# Patient Record
Sex: FEMALE | Race: WHITE | NOT HISPANIC OR LATINO | Employment: FULL TIME | URBAN - METROPOLITAN AREA
[De-identification: names, ages, dates, MRNs, and addresses within clinical notes are randomized per-mention and may not be internally consistent; named-entity substitution may affect disease eponyms.]

---

## 2023-09-27 PROBLEM — F41.9 ANXIETY: Status: ACTIVE | Noted: 2023-09-27

## 2023-09-27 PROBLEM — D25.9 FIBROID UTERUS: Status: ACTIVE | Noted: 2023-09-27

## 2023-09-27 PROBLEM — R92.0 ABNORMAL MAMMOGRAM WITH MICROCALCIFICATION: Status: ACTIVE | Noted: 2023-09-27

## 2023-09-27 PROBLEM — R92.8 ABNORMAL MAMMOGRAM: Status: ACTIVE | Noted: 2023-09-27

## 2023-09-27 PROBLEM — R11.2 NAUSEA WITH VOMITING: Status: ACTIVE | Noted: 2023-09-27

## 2023-09-27 PROBLEM — N94.6 DYSMENORRHEA: Status: ACTIVE | Noted: 2023-09-27

## 2023-09-27 PROBLEM — N93.8 DYSFUNCTIONAL UTERINE BLEEDING: Status: ACTIVE | Noted: 2023-09-27

## 2023-09-27 PROBLEM — H61.23 BILATERAL IMPACTED CERUMEN: Status: ACTIVE | Noted: 2023-09-27

## 2023-09-27 PROBLEM — H60.543 ECZEMA OF BOTH EXTERNAL EARS: Status: ACTIVE | Noted: 2023-09-27

## 2023-09-27 PROBLEM — H90.3 SENSORINEURAL HEARING LOSS (SNHL) OF BOTH EARS: Status: ACTIVE | Noted: 2023-09-27

## 2023-09-27 PROBLEM — H69.93 DYSFUNCTION OF BOTH EUSTACHIAN TUBES: Status: ACTIVE | Noted: 2023-09-27

## 2023-09-27 RX ORDER — MELATONIN 5 MG
CAPSULE ORAL
COMMUNITY

## 2023-09-27 RX ORDER — ACETAMINOPHEN 500 MG
TABLET ORAL
COMMUNITY
End: 2023-10-30 | Stop reason: SDUPTHER

## 2023-09-27 RX ORDER — FLUTICASONE PROPIONATE 50 MCG
SPRAY, SUSPENSION (ML) NASAL
COMMUNITY
End: 2023-10-30 | Stop reason: WASHOUT

## 2023-09-27 RX ORDER — CETIRIZINE HYDROCHLORIDE 10 MG/1
1 TABLET ORAL DAILY
COMMUNITY

## 2023-09-27 RX ORDER — PAROXETINE HYDROCHLORIDE HEMIHYDRATE 12.5 MG/1
TABLET, FILM COATED, EXTENDED RELEASE ORAL
COMMUNITY
End: 2023-10-30 | Stop reason: WASHOUT

## 2023-09-27 RX ORDER — POLYETHYLENE GLYCOL 3350, SODIUM CHLORIDE, SODIUM BICARBONATE, POTASSIUM CHLORIDE 420; 11.2; 5.72; 1.48 G/4L; G/4L; G/4L; G/4L
POWDER, FOR SOLUTION ORAL
COMMUNITY
Start: 2022-09-19 | End: 2023-10-30 | Stop reason: WASHOUT

## 2023-10-28 NOTE — PROGRESS NOTES
"HPI    PAP 11-29-18 NEG HPV NEG  MAMMO 12-29-22  DEXA NEVER  COLON 12-12-22  Last edited by Fabrice Smith MA on 10/30/2023  2:53 PM.          Subjective   Patient ID: Ele Echols is a 52 y.o. female who presents for No chief complaint on file..  PAP 11-29-18 NEG HPV NEGy, TLH, salpingectomies with Dr. Rascon 1/27/21, benign pathology, no more paps.  MAMMO 12/29/22 NEG   DEXA NEVER  COLON 12/12/22 colonoscopy, polyps. 5 years.     My first visit since hysterectomy with Dr. Rascon. 26 yo son with [facility] and traveling. Other son 24 at home, working [employer]. Patient working [employer]. Got a puppy. \"Segundo\".     Stopped seeing wellness doctor. Sees Dr. Hamm.     Sister with bad thyroid issue. Had parathyroid removed. Has elevated LFTs and liver size. Patient tested negative.     Patient has anxiety, no treatment.     Review of Systems   Genitourinary:         Hot flashes       Objective   Constitutional: Alert and in no acute distress. Well developed, well nourished.  Neck: no neck asymmetry. Supple and thyroid not enlarged and there were no palpable thyroid nodules.  Chest: Breasts normal appearance, no nipple discharge and no skin changes and palpation of breasts and axillae: no palpable mass and no axillary lymphadenopathy.  Abdomen: soft nontender; no abdominal mass palpated, no organomegaly and no hernias.  Genitourinary: external genitalia: normal, no inguinal lymphadenopathy, Bartholin's urethral and [place]'s glands: normal, urethra: normal and bladder: normal on palpation.  Vagina: normal.  Cervix: absent.  Uterus: absent.  Right adnexa/parametria: normal.  Left adnexa/parametria: normal.  Skin: normal skin color and pigmentation, normal skin turgor and no rash.  Psychiatric: alert and oriented x 3, affect normal to patient baseline and mood appropriate.     Assessment/Plan   -no paps  -has silvino order will check with insurance because it got scheduled early.  "

## 2023-10-30 ENCOUNTER — OFFICE VISIT (OUTPATIENT)
Dept: OBSTETRICS AND GYNECOLOGY | Facility: CLINIC | Age: 53
End: 2023-10-30
Payer: COMMERCIAL

## 2023-10-30 VITALS
DIASTOLIC BLOOD PRESSURE: 80 MMHG | HEIGHT: 63 IN | WEIGHT: 188 LBS | SYSTOLIC BLOOD PRESSURE: 128 MMHG | BODY MASS INDEX: 33.31 KG/M2

## 2023-10-30 DIAGNOSIS — Z01.419 WELL WOMAN EXAM WITH ROUTINE GYNECOLOGICAL EXAM: Primary | ICD-10-CM

## 2023-10-30 PROCEDURE — 99396 PREV VISIT EST AGE 40-64: CPT | Performed by: OBSTETRICS & GYNECOLOGY

## 2023-10-30 PROCEDURE — 1036F TOBACCO NON-USER: CPT | Performed by: OBSTETRICS & GYNECOLOGY

## 2023-11-15 ENCOUNTER — APPOINTMENT (OUTPATIENT)
Dept: RADIOLOGY | Facility: CLINIC | Age: 53
End: 2023-11-15
Payer: COMMERCIAL

## 2024-01-02 ENCOUNTER — APPOINTMENT (OUTPATIENT)
Dept: RADIOLOGY | Facility: CLINIC | Age: 54
End: 2024-01-02
Payer: COMMERCIAL

## 2024-01-24 ENCOUNTER — HOSPITAL ENCOUNTER (OUTPATIENT)
Dept: RADIOLOGY | Facility: CLINIC | Age: 54
Discharge: HOME | End: 2024-01-24
Payer: COMMERCIAL

## 2024-01-24 VITALS — HEIGHT: 63 IN | WEIGHT: 188.05 LBS | BODY MASS INDEX: 33.32 KG/M2

## 2024-01-24 DIAGNOSIS — Z12.31 ENCOUNTER FOR SCREENING MAMMOGRAM FOR MALIGNANT NEOPLASM OF BREAST: ICD-10-CM

## 2024-01-24 PROCEDURE — 77067 SCR MAMMO BI INCL CAD: CPT | Mod: BILATERAL PROCEDURE | Performed by: RADIOLOGY

## 2024-01-24 PROCEDURE — 77067 SCR MAMMO BI INCL CAD: CPT

## 2024-01-24 PROCEDURE — 77063 BREAST TOMOSYNTHESIS BI: CPT | Mod: BILATERAL PROCEDURE | Performed by: RADIOLOGY

## 2024-03-25 ENCOUNTER — TELEMEDICINE (OUTPATIENT)
Dept: PRIMARY CARE | Facility: CLINIC | Age: 54
End: 2024-03-25
Payer: COMMERCIAL

## 2024-03-25 DIAGNOSIS — J01.90 ACUTE NON-RECURRENT SINUSITIS, UNSPECIFIED LOCATION: Primary | ICD-10-CM

## 2024-03-25 PROCEDURE — 1036F TOBACCO NON-USER: CPT | Performed by: FAMILY MEDICINE

## 2024-03-25 PROCEDURE — 99213 OFFICE O/P EST LOW 20 MIN: CPT | Performed by: FAMILY MEDICINE

## 2024-03-25 RX ORDER — DOXYCYCLINE 100 MG/1
100 CAPSULE ORAL 2 TIMES DAILY
Qty: 14 CAPSULE | Refills: 0 | Status: SHIPPED | OUTPATIENT
Start: 2024-03-25 | End: 2024-04-01

## 2024-03-25 ASSESSMENT — ENCOUNTER SYMPTOMS
SHORTNESS OF BREATH: 0
NECK STIFFNESS: 0
CONSTIPATION: 0
FEVER: 1
SINUS PAIN: 1
NECK PAIN: 0
DIARRHEA: 0
SINUS PRESSURE: 1
MYALGIAS: 0
NAUSEA: 0
WHEEZING: 0
CHILLS: 1
VOMITING: 0
PALPITATIONS: 0
DIZZINESS: 0
RHINORRHEA: 1
FATIGUE: 1
COUGH: 1
CHEST TIGHTNESS: 0
LIGHT-HEADEDNESS: 0
WEAKNESS: 0

## 2024-03-25 NOTE — PROGRESS NOTES
Chief Complaint Ele Echols is a 53 y.o. female who presents for concerns for sinus infection via video telehealth visit      HPI:  Pt stated that approximately a week ago patient started having fever, chills, insomnia, ear and jaw pain along with sinus pain and pressure. Patient stated that she also has chronic allergic rhinitis and thinks she started her nasal spray and zytrec too late which lead to her sinus infection. Patient is also complaining of cough and congestion. Patient has been using fluticasone once every morning and motrin. Denies any nasal saline rinses or mucinex.    ROS:  Review of Systems   Constitutional:  Positive for chills, fatigue and fever.   HENT:  Positive for congestion, ear pain, rhinorrhea, sinus pressure and sinus pain.    Respiratory:  Positive for cough. Negative for chest tightness, shortness of breath and wheezing.    Cardiovascular:  Negative for chest pain, palpitations and leg swelling.   Gastrointestinal:  Negative for constipation, diarrhea, nausea and vomiting.   Musculoskeletal:  Negative for myalgias, neck pain and neck stiffness.   Skin:  Negative for rash.   Neurological:  Negative for dizziness, syncope, weakness and light-headedness.         Meds:    Current Outpatient Medications:     cetirizine (ZyrTEC) 10 mg tablet, Take 1 tablet (10 mg) by mouth once daily., Disp: , Rfl:     doxycycline (Vibramycin) 100 mg capsule, Take 1 capsule (100 mg) by mouth 2 times a day for 7 days. Take with at least 8 ounces (large glass) of water, do not lie down for 30 minutes after, Disp: 14 capsule, Rfl: 0    melatonin 5 mg capsule, Take by mouth once daily., Disp: , Rfl:     multivit with minerals/lutein (MULTIVITAMIN 50 PLUS ORAL), Take 1 tablet by mouth once daily., Disp: , Rfl:     ZINC ACETATE ORAL, Take 1 tablet by mouth once daily., Disp: , Rfl:     Allergies:  Allergies   Allergen Reactions    Codeine Unknown    Morphine Unknown    Penicillins Unknown    Tramadol Itching and  Rash       PE:  Visit Vitals  Smoking Status Never     Physical Exam  Limited due to video telehealth exam  Patient looked to be in no acute distress, no shortness of breath was noted during the video visit. Patient was A&Ox3.    Assessment/Plan  Diagnoses and all orders for this visit:  Acute non-recurrent sinusitis, unspecified location (Primary)  -     doxycycline (Vibramycin) 100 mg capsule; Take 1 capsule (100 mg) by mouth 2 times a day for 7 days. Take with at least 8 ounces (large glass) of water, do not lie down for 30 minutes after     Patient is a 54 yo F who is presenting to the office for concern for sinus infection via video telehealth visit. Due to fever, chills, sinus pain and pressure, jaw and ear pain it is more likely that the patient has a sinus infection. It is unclear whether it is viral or bacterial etiology at this time. Patient was prescribed doxycycline. It was recommended that the patient continue to take her fluticasone nasal spray, motrin as needed for pain and fever, and allergy medication. Patient was recommended to go the ER if she develops in chest pain or shortness of breath. Patient was also recommended to follow up in one week if not improved.      Follow up in one week if not improved      Patient was staffed with Dr. Hansel Arzola DO, PGY-2  [facility]

## 2024-03-26 NOTE — PROGRESS NOTES
I reviewed and examined the patient. I was present for the key exam elements, and I fully participated in the patient's care. I discussed the management of the care with the resident. I have personally reviewed the pertinent labs and imaging, as well as recent notes, with the patient. I have reviewed the note above and agree with the resident's medical decision making as documented in the resident's note, in addition to the following comments / findings:     Agree with the rest of the plan outlined below by resident physician. No red flags.      The patient understands and agrees to the assessment and plan of care. Patient has also agreed to follow up and comply with the treatment and evaluation as recommended today. Patient was instructed to call the office at [phone] should questions arise regarding their treatment or care.     Armin Hamm DO, FAOASM  Family Medicine   [address], [address]  [place] [address]     Armin Hamm DO

## 2024-06-14 ENCOUNTER — APPOINTMENT (OUTPATIENT)
Dept: PRIMARY CARE | Facility: CLINIC | Age: 54
End: 2024-06-14
Payer: COMMERCIAL

## 2024-06-14 VITALS
BODY MASS INDEX: 34.78 KG/M2 | WEIGHT: 189 LBS | SYSTOLIC BLOOD PRESSURE: 114 MMHG | HEIGHT: 62 IN | HEART RATE: 88 BPM | OXYGEN SATURATION: 97 % | TEMPERATURE: 97.9 F | DIASTOLIC BLOOD PRESSURE: 69 MMHG

## 2024-06-14 DIAGNOSIS — Z00.00 HEALTHCARE MAINTENANCE: Primary | ICD-10-CM

## 2024-06-14 DIAGNOSIS — R07.89 OTHER CHEST PAIN: ICD-10-CM

## 2024-06-14 DIAGNOSIS — Z13.220 LIPID SCREENING: ICD-10-CM

## 2024-06-14 DIAGNOSIS — Z13.1 DIABETES MELLITUS SCREENING: ICD-10-CM

## 2024-06-14 DIAGNOSIS — N30.00 ACUTE CYSTITIS WITHOUT HEMATURIA: ICD-10-CM

## 2024-06-14 DIAGNOSIS — F41.9 ANXIETY: ICD-10-CM

## 2024-06-14 DIAGNOSIS — Z13.29 THYROID DISORDER SCREEN: ICD-10-CM

## 2024-06-14 LAB
POC APPEARANCE, URINE: CLEAR
POC BILIRUBIN, URINE: NEGATIVE
POC BLOOD, URINE: ABNORMAL
POC COLOR, URINE: YELLOW
POC GLUCOSE, URINE: NEGATIVE MG/DL
POC KETONES, URINE: NEGATIVE MG/DL
POC LEUKOCYTES, URINE: ABNORMAL
POC NITRITE,URINE: NEGATIVE
POC PH, URINE: 6 PH
POC PROTEIN, URINE: NEGATIVE MG/DL
POC SPECIFIC GRAVITY, URINE: 1.02
POC UROBILINOGEN, URINE: 0.2 EU/DL

## 2024-06-14 RX ORDER — NITROFURANTOIN 25; 75 MG/1; MG/1
100 CAPSULE ORAL 2 TIMES DAILY
Qty: 10 CAPSULE | Refills: 0 | Status: SHIPPED | OUTPATIENT
Start: 2024-06-14 | End: 2024-06-19

## 2024-06-14 ASSESSMENT — COLUMBIA-SUICIDE SEVERITY RATING SCALE - C-SSRS
6. HAVE YOU EVER DONE ANYTHING, STARTED TO DO ANYTHING, OR PREPARED TO DO ANYTHING TO END YOUR LIFE?: NO
1. IN THE PAST MONTH, HAVE YOU WISHED YOU WERE DEAD OR WISHED YOU COULD GO TO SLEEP AND NOT WAKE UP?: NO
2. HAVE YOU ACTUALLY HAD ANY THOUGHTS OF KILLING YOURSELF?: NO

## 2024-06-14 ASSESSMENT — ENCOUNTER SYMPTOMS
LOSS OF SENSATION IN FEET: 0
OCCASIONAL FEELINGS OF UNSTEADINESS: 0
DEPRESSION: 0

## 2024-06-14 ASSESSMENT — PATIENT HEALTH QUESTIONNAIRE - PHQ9
2. FEELING DOWN, DEPRESSED OR HOPELESS: NOT AT ALL
1. LITTLE INTEREST OR PLEASURE IN DOING THINGS: NOT AT ALL
SUM OF ALL RESPONSES TO PHQ9 QUESTIONS 1 AND 2: 0

## 2024-06-14 ASSESSMENT — PAIN SCALES - GENERAL: PAINLEVEL: 0-NO PAIN

## 2024-06-14 NOTE — PROGRESS NOTES
Subjective   Patient ID: Ele Echols is a 53 y.o. female who presents for Annual Exam (Would like to do ct calcium scoring ), Urinary Frequency, and shooting pains rt arm on and off.  Today she is accompanied by alone.     HPI    1. Health Maintenance  Overall patient is doing well.   Immunization:   -Tdap unsure of when she last had it, will think about updating it  -Shingrix Up to date    Colon Cancer Screening: No family history, colonoscopy done in 2022 with 5 recurrence  OB/GYN: Sees Dr. Baum; Hysterectomy in 2021 and mammogram up to date.   Diet: Would like to work on it  Exercise: active lifestyle   Tobacco: Denies use  EtOH: Rarely Socially     2.  Chest pain/anxiety  Patient claims ongoing chest pains occurring when patient is anxious.  States that she is not sure if the anxiety is bringing up chest pain, or if she gets chest pain and becomes anxious as to why she got chest pains.  Describes the pain radiating to her left arm and shoulder.  Does believe to have some chest pain upon exertion    3. Urinary Frequency   Has been going on for a few years but increased this past week after patient went on vacation and had been swimming every day for a week  In addition complains of some dysuria and urgency, denies any blood in urine.          Current Outpatient Medications on File Prior to Visit   Medication Sig Dispense Refill    melatonin 5 mg capsule Take by mouth once daily.      multivit with minerals/lutein (MULTIVITAMIN 50 PLUS ORAL) Take 1 tablet by mouth once daily.      cetirizine (ZyrTEC) 10 mg tablet Take 1 tablet (10 mg) by mouth once daily.      ZINC ACETATE ORAL Take 1 tablet by mouth once daily.       No current facility-administered medications on file prior to visit.        Allergies   Allergen Reactions    Codeine Unknown    Morphine Unknown    Penicillins Unknown    Tramadol Itching and Rash       Immunization History   Administered Date(s) Administered    Flu vaccine (IIV4), preservative  "free *Check age/dose* 10/12/2019, 11/17/2023    Moderna SARS-CoV-2 Vaccination 04/13/2021, 05/13/2021    Zoster vaccine, recombinant, adult (SHINGRIX) 09/15/2023, 12/01/2023         Review of Systems  All pertinent positive symptoms are included in the history of present illness.  All other systems have been reviewed and are negative and noncontributory to this patient's current ailments.     Objective   /69   Pulse 88   Temp 36.6 °C (97.9 °F)   Ht 1.575 m (5' 2\")   Wt 85.7 kg (189 lb)   SpO2 97%   BMI 34.57 kg/m²   BSA: 1.94 meters squared      Physical Exam  CONSTITUTIONAL - well nourished, well developed, looks like stated age, in no acute distress, not ill-appearing, and not tired appearing  SKIN - normal skin color and pigmentation, normal skin turgor without rash, lesions, or nodules visualized  HEAD - no trauma, normocephalic  EYES - pupils are equal and reactive to light, extraocular muscles are intact, and normal external exam  ENT - TM's intact, no injection, no signs of infection, uvula midline, normal tongue movement and throat normal, no exudate, nasal passage without discharge and patent  NECK - supple without rigidity, no neck mass was observed,   LUNG - clear to auscultation, no wheezing, no crackles and no rales, good effort  CARDIAC - regular rate and regular rhythm, no skipped beats, no murmur  ABDOMEN - no organomegaly, soft, nontender, nondistended, normal bowel sounds  EXTREMITIES - no edema, no deformities  NEUROLOGICAL - normal gait, normal balance, normal motor, alert, oriented and no focal signs  PSYCHIATRIC - alert, pleasant and cordial, age-appropriate    Assessment/Plan     1. Health maintenance  Overall patient is doing well.    Complete history and physical examination was performed   Lab work was ordered and will notify of test results and make recommendations accordingly  Colon Cancer screening due 2027  Please attempt to eat a well-balanced diet and exercise " regularly    2.  Chest pain/anxiety  A requisition for a CT cardiac score has been placed for you  In addition, a requisition for a stress echo has been placed.  If at any time you develop increased chest pain/pressure, or feel that you are having heart attack please go to the emergency room.    3.  Acute cystitis  POCT UA with small amount of leuks in urine, no nitrites  Prescription for Macrobid twice daily x 5 days sent to your pharmacy  Risks, benefits, and options of medication(s) above were discussed with instructions on the medication usage for your infection  I encouraged supportive care such as rest, fluids and Advil/Tylenol as warranted  RTC in 5-7 days or sooner if symptoms worsen or persist

## 2024-06-15 NOTE — PROGRESS NOTES
I reviewed and examined the patient. I was present for the key exam elements, and I fully participated in the patient's care. I discussed the management of the care with the resident. I have personally reviewed the pertinent labs and imaging, as well as recent notes, with the patient. I have reviewed the note above and agree with the resident's medical decision making as documented in the resident's note, in addition to the following comments / findings:     Agree with the rest of the plan outlined below by resident physician. No red flags.      The patient understands and agrees to the assessment and plan of care. Patient has also agreed to follow up and comply with the treatment and evaluation as recommended today. Patient was instructed to call the office at [phone] should questions arise regarding their treatment or care.     Armin Hamm DO, FAOASM  Family Medicine   [address], [address]  [place] [address]     Armin Hamm DO

## 2024-06-18 ENCOUNTER — APPOINTMENT (OUTPATIENT)
Dept: PRIMARY CARE | Facility: CLINIC | Age: 54
End: 2024-06-18
Payer: COMMERCIAL

## 2024-07-18 ENCOUNTER — APPOINTMENT (OUTPATIENT)
Dept: CARDIOLOGY | Facility: HOSPITAL | Age: 54
End: 2024-07-18
Payer: COMMERCIAL

## 2024-07-22 ENCOUNTER — APPOINTMENT (OUTPATIENT)
Dept: CARDIOLOGY | Facility: HOSPITAL | Age: 54
End: 2024-07-22
Payer: COMMERCIAL

## 2024-08-31 ENCOUNTER — LAB (OUTPATIENT)
Dept: LAB | Facility: LAB | Age: 54
End: 2024-08-31
Payer: COMMERCIAL

## 2024-08-31 DIAGNOSIS — Z13.220 LIPID SCREENING: ICD-10-CM

## 2024-08-31 DIAGNOSIS — Z13.1 DIABETES MELLITUS SCREENING: ICD-10-CM

## 2024-08-31 DIAGNOSIS — Z00.00 HEALTHCARE MAINTENANCE: ICD-10-CM

## 2024-08-31 LAB
ALBUMIN SERPL BCP-MCNC: 4.2 G/DL (ref 3.4–5)
ALP SERPL-CCNC: 51 U/L (ref 33–110)
ALT SERPL W P-5'-P-CCNC: 25 U/L (ref 7–45)
ANION GAP SERPL CALC-SCNC: 14 MMOL/L (ref 10–20)
AST SERPL W P-5'-P-CCNC: 20 U/L (ref 9–39)
BASOPHILS # BLD AUTO: 0.05 X10*3/UL (ref 0–0.1)
BASOPHILS NFR BLD AUTO: 0.6 %
BILIRUB SERPL-MCNC: 0.7 MG/DL (ref 0–1.2)
BUN SERPL-MCNC: 11 MG/DL (ref 6–23)
CALCIUM SERPL-MCNC: 8.8 MG/DL (ref 8.6–10.3)
CHLORIDE SERPL-SCNC: 107 MMOL/L (ref 98–107)
CHOLEST SERPL-MCNC: 125 MG/DL (ref 0–199)
CHOLESTEROL/HDL RATIO: 3.2
CO2 SERPL-SCNC: 21 MMOL/L (ref 21–32)
CREAT SERPL-MCNC: 0.75 MG/DL (ref 0.5–1.05)
EGFRCR SERPLBLD CKD-EPI 2021: >90 ML/MIN/1.73M*2
EOSINOPHIL # BLD AUTO: 0.2 X10*3/UL (ref 0–0.7)
EOSINOPHIL NFR BLD AUTO: 2.5 %
ERYTHROCYTE [DISTWIDTH] IN BLOOD BY AUTOMATED COUNT: 13.7 % (ref 11.5–14.5)
EST. AVERAGE GLUCOSE BLD GHB EST-MCNC: 128 MG/DL
GLUCOSE SERPL-MCNC: 115 MG/DL (ref 74–99)
HBA1C MFR BLD: 6.1 %
HCT VFR BLD AUTO: 41.3 % (ref 36–46)
HDLC SERPL-MCNC: 39.1 MG/DL
HGB BLD-MCNC: 13.7 G/DL (ref 12–16)
IMM GRANULOCYTES # BLD AUTO: 0.02 X10*3/UL (ref 0–0.7)
IMM GRANULOCYTES NFR BLD AUTO: 0.2 % (ref 0–0.9)
LDLC SERPL CALC-MCNC: 71 MG/DL
LYMPHOCYTES # BLD AUTO: 2.53 X10*3/UL (ref 1.2–4.8)
LYMPHOCYTES NFR BLD AUTO: 31.4 %
MCH RBC QN AUTO: 29.5 PG (ref 26–34)
MCHC RBC AUTO-ENTMCNC: 33.2 G/DL (ref 32–36)
MCV RBC AUTO: 89 FL (ref 80–100)
MONOCYTES # BLD AUTO: 0.68 X10*3/UL (ref 0.1–1)
MONOCYTES NFR BLD AUTO: 8.4 %
NEUTROPHILS # BLD AUTO: 4.59 X10*3/UL (ref 1.2–7.7)
NEUTROPHILS NFR BLD AUTO: 56.9 %
NON HDL CHOLESTEROL: 86 MG/DL (ref 0–149)
NRBC BLD-RTO: 0 /100 WBCS (ref 0–0)
PLATELET # BLD AUTO: 260 X10*3/UL (ref 150–450)
POTASSIUM SERPL-SCNC: 4.2 MMOL/L (ref 3.5–5.3)
PROT SERPL-MCNC: 6.7 G/DL (ref 6.4–8.2)
RBC # BLD AUTO: 4.64 X10*6/UL (ref 4–5.2)
SODIUM SERPL-SCNC: 138 MMOL/L (ref 136–145)
TRIGL SERPL-MCNC: 73 MG/DL (ref 0–149)
TSH SERPL-ACNC: 2.27 MIU/L (ref 0.44–3.98)
VLDL: 15 MG/DL (ref 0–40)
WBC # BLD AUTO: 8.1 X10*3/UL (ref 4.4–11.3)

## 2024-08-31 PROCEDURE — 85025 COMPLETE CBC W/AUTO DIFF WBC: CPT

## 2024-08-31 PROCEDURE — 36415 COLL VENOUS BLD VENIPUNCTURE: CPT

## 2024-08-31 PROCEDURE — 80061 LIPID PANEL: CPT

## 2024-08-31 PROCEDURE — 84443 ASSAY THYROID STIM HORMONE: CPT

## 2024-08-31 PROCEDURE — 83036 HEMOGLOBIN GLYCOSYLATED A1C: CPT

## 2024-08-31 PROCEDURE — 80053 COMPREHEN METABOLIC PANEL: CPT

## 2024-09-19 ENCOUNTER — APPOINTMENT (OUTPATIENT)
Dept: RADIOLOGY | Facility: HOSPITAL | Age: 54
End: 2024-09-19
Payer: COMMERCIAL

## 2024-10-03 ENCOUNTER — OFFICE VISIT (OUTPATIENT)
Dept: PRIMARY CARE | Facility: CLINIC | Age: 54
End: 2024-10-03
Payer: COMMERCIAL

## 2024-10-03 VITALS
OXYGEN SATURATION: 99 % | BODY MASS INDEX: 33.31 KG/M2 | HEIGHT: 62 IN | TEMPERATURE: 98.3 F | DIASTOLIC BLOOD PRESSURE: 74 MMHG | SYSTOLIC BLOOD PRESSURE: 116 MMHG | WEIGHT: 181 LBS | HEART RATE: 78 BPM

## 2024-10-03 DIAGNOSIS — R10.11 RIGHT UPPER QUADRANT ABDOMINAL PAIN: Primary | ICD-10-CM

## 2024-10-03 PROCEDURE — 99214 OFFICE O/P EST MOD 30 MIN: CPT | Performed by: FAMILY MEDICINE

## 2024-10-03 PROCEDURE — 3008F BODY MASS INDEX DOCD: CPT | Performed by: FAMILY MEDICINE

## 2024-10-03 ASSESSMENT — ENCOUNTER SYMPTOMS
EYE ITCHING: 0
NAUSEA: 0
BACK PAIN: 0
CONSTIPATION: 0
CHEST TIGHTNESS: 0
WOUND: 0
NERVOUS/ANXIOUS: 0
SHORTNESS OF BREATH: 0
FATIGUE: 0
JOINT SWELLING: 0
VOMITING: 0
PALPITATIONS: 0
WEAKNESS: 0
CHILLS: 0
RHINORRHEA: 0
DIZZINESS: 0
MYALGIAS: 0
WHEEZING: 0
POLYPHAGIA: 0
SINUS PAIN: 0
ABDOMINAL PAIN: 1
CONFUSION: 0
FEVER: 0
TREMORS: 0
DIARRHEA: 0
ABDOMINAL DISTENTION: 1
PHOTOPHOBIA: 0
POLYDIPSIA: 0
COUGH: 0

## 2024-10-03 NOTE — PROGRESS NOTES
"Subjective   Patient ID: Ele Echols is a 53 y.o. female who presents for Pain on left side under rib cage.     HPI   Patient is under a lot of stress recently including her father with kidney failure, her  injured from a fall off of a ladder, and then her dog being bitten and in the hospital from a [place] prince.     She now is presenting with left sided pain near rib cage that has been present all summer. She describes it as a bloaty pressure like pain. The area looks like it bulges and gets hard. \"Feels like its going to burst.\" The pressure and bloat is intermittent and maybe associated with eating. Causes her pain at night, often waking her up. No tenderness to palpation is present.     Has been lifting her dog constantly to move it because of its injury. This has been going on since August.     PMHx: Dysmenorrhea, Anxiety, Fibroid Uterus, hiatal hernia    Review of Systems   Constitutional:  Negative for chills, fatigue and fever.   HENT:  Negative for congestion, rhinorrhea and sinus pain.    Eyes:  Negative for photophobia and itching.   Respiratory:  Negative for cough, chest tightness, shortness of breath and wheezing.    Cardiovascular:  Negative for palpitations and leg swelling.   Gastrointestinal:  Positive for abdominal distention and abdominal pain. Negative for constipation, diarrhea, nausea and vomiting.   Endocrine: Negative for polydipsia and polyphagia.   Musculoskeletal:  Negative for back pain, joint swelling and myalgias.   Skin:  Negative for rash and wound.   Neurological:  Negative for dizziness, tremors, syncope and weakness.   Psychiatric/Behavioral:  Negative for confusion. The patient is not nervous/anxious.        Objective   /74   Pulse 78   Temp 36.8 °C (98.3 °F)   Ht 1.575 m (5' 2\")   Wt 82.1 kg (181 lb)   SpO2 99%   BMI 33.11 kg/m²     Physical Exam  Constitutional:       General: She is not in acute distress.     Appearance: Normal appearance.   HENT:      " "Head: Normocephalic and atraumatic.   Eyes:      General: No scleral icterus.  Cardiovascular:      Rate and Rhythm: Normal rate and regular rhythm.      Heart sounds: No murmur heard.  Pulmonary:      Effort: Pulmonary effort is normal.      Breath sounds: Normal breath sounds. No wheezing.   Abdominal:      Tenderness: There is no guarding or rebound.      Comments: Tender to palpation on left side of abdomen. Slight bulging present upon standing exam in left upper quadrant. Bowel sounds normal x4. No organomegaly.    Musculoskeletal:      Right lower leg: No edema.      Left lower leg: No edema.   Neurological:      General: No focal deficit present.      Mental Status: She is alert and oriented to person, place, and time.      Motor: No weakness.   Psychiatric:         Mood and Affect: Mood normal.         Behavior: Behavior normal.         Assessment/Plan   Ele Echols is a 53 year old female with PMHx of hiatal hernia who presents today for left upper quadrant pain of three months duration. She describes this as \"feeling like something is going to burst\" in her left upper quadrant. She is tender to palpation along her left side of her abdomen. There is minimal distention upon exam with patient standing. I would like to rule out hernia/possible diverticulitis with CT abd/pelvis w/ IV contrast. Her kidney function is wnl according to 8/31/24 labs    1. Right upper quadrant abdominal pain    - CT abdomen pelvis w IV contrast  - Follow up to review image results  - Continue to eat foods that do not cause GI upset, as this has helped manage symptoms      Any [place] OMS-IV    I have reviewed  and agree with the assessment and plan as stated above.    Patient was staffed with Stephanie Ontiveros DO, PGY-3  [facility]  "

## 2024-10-04 NOTE — PROGRESS NOTES
I reviewed and examined the patient. I was present for the key exam elements, and I fully participated in the patient's care. I discussed the management of the care with the resident. I have personally reviewed the pertinent labs and imaging, as well as recent notes, with the patient. I have reviewed the note above and agree with the resident's medical decision making as documented in the resident's note, in addition to the following comments / findings:     Agree with the rest of the plan outlined below by resident physician. No red flags.      The patient understands and agrees to the assessment and plan of care. Patient has also agreed to follow up and comply with the treatment and evaluation as recommended today. Patient was instructed to call the office at [phone] should questions arise regarding their treatment or care.     Armin Hamm DO, FAOASM  Family Medicine   [address], [address]  [place] [address]     Armin Hamm DO

## 2024-10-08 DIAGNOSIS — R10.10 PAIN OF UPPER ABDOMEN: Primary | ICD-10-CM

## 2024-10-18 ENCOUNTER — APPOINTMENT (OUTPATIENT)
Dept: RADIOLOGY | Facility: HOSPITAL | Age: 54
End: 2024-10-18
Payer: COMMERCIAL

## 2024-10-25 ENCOUNTER — HOSPITAL ENCOUNTER (OUTPATIENT)
Dept: RADIOLOGY | Facility: HOSPITAL | Age: 54
Discharge: HOME | End: 2024-10-25
Payer: COMMERCIAL

## 2024-10-25 DIAGNOSIS — R07.89 OTHER CHEST PAIN: ICD-10-CM

## 2024-10-25 DIAGNOSIS — Z13.220 LIPID SCREENING: ICD-10-CM

## 2024-10-25 DIAGNOSIS — R10.10 PAIN OF UPPER ABDOMEN: ICD-10-CM

## 2024-10-25 PROCEDURE — A9698 NON-RAD CONTRAST MATERIALNOC: HCPCS | Performed by: FAMILY MEDICINE

## 2024-10-25 PROCEDURE — 74150 CT ABDOMEN W/O CONTRAST: CPT

## 2024-10-25 PROCEDURE — 2550000001 HC RX 255 CONTRASTS: Performed by: FAMILY MEDICINE

## 2024-10-25 PROCEDURE — 75571 CT HRT W/O DYE W/CA TEST: CPT

## 2024-11-01 ENCOUNTER — TELEPHONE (OUTPATIENT)
Dept: PRIMARY CARE | Facility: CLINIC | Age: 54
End: 2024-11-01
Payer: COMMERCIAL

## 2025-01-10 ENCOUNTER — APPOINTMENT (OUTPATIENT)
Dept: RADIOLOGY | Facility: HOSPITAL | Age: 55
End: 2025-01-10
Payer: COMMERCIAL

## 2025-02-03 ENCOUNTER — TELEPHONE (OUTPATIENT)
Dept: OBSTETRICS AND GYNECOLOGY | Facility: CLINIC | Age: 55
End: 2025-02-03
Payer: COMMERCIAL

## 2025-02-18 NOTE — PROGRESS NOTES
Subjective   Patient ID: Ele Echols is a 54 y.o. female who presents for Annual Exam (Annual/Mammogram 1/24/2024/Pap 2018).    PAP 11-29-18 NEG HPV NEG, TLH, salpingectomies with Dr. Rascon 1/27/21, benign pathology, no more paps.  MAMMO 1-24-24  DEXA NEVER  COLON 12/12/22 colonoscopy, polyps. 5 years.     hysterectomy with Dr. Rascon. 26 yo son with [facility] and traveling. Other son 24 at home, working [employer]. Patient working [employer] company. Has a corgie. Thinking of downsizing.  Some hot flashes, no vaginal dryness.     Stopped seeing wellness doctor. Sees Dr. Hamm. Shooting pains in right arm recently.      Sister with bad thyroid issue. Had parathyroid removed. Has liver deficiency just diagnosed and patient needs to get a test for it. **Alpha 1 antitrypson deficiency.     Patient has anxiety, no treatment.   Father on dialysis. Working on in home dialysis.  fell off a ladder and dislocated shoulder.      Review of Systems   Constitutional:  Positive for unexpected weight change.   All other systems reviewed and are negative.      Objective   Constitutional: Alert and in no acute distress. Well developed, well nourished.  Neck: no neck asymmetry. Supple and thyroid not enlarged and there were no palpable thyroid nodules.  Chest: Breasts normal appearance, no nipple discharge and no skin changes and palpation of breasts and axillae: no palpable mass and no axillary lymphadenopathy.  Abdomen: soft nontender; no abdominal mass palpated, no organomegaly and no hernias.  Genitourinary: external genitalia: normal, no inguinal lymphadenopathy, Bartholin's urethral and [place]'s glands: normal, urethra: normal and bladder: normal on palpation.  Vagina: normal.  Cervix: absent.  Uterus: absent.  Right adnexa/parametria: normal.  Left adnexa/parametria: normal.  Skin: normal skin color and pigmentation, normal skin turgor and no rash.  Psychiatric: alert and oriented x 3, affect normal to  patient baseline and mood appropriate.     Assessment/Plan   -no pap  -silvino-zoya  -patient will get testing done on her liver.

## 2025-02-20 ENCOUNTER — APPOINTMENT (OUTPATIENT)
Dept: OBSTETRICS AND GYNECOLOGY | Facility: CLINIC | Age: 55
End: 2025-02-20
Payer: COMMERCIAL

## 2025-02-20 VITALS
WEIGHT: 192 LBS | HEIGHT: 63 IN | DIASTOLIC BLOOD PRESSURE: 74 MMHG | SYSTOLIC BLOOD PRESSURE: 116 MMHG | BODY MASS INDEX: 34.02 KG/M2

## 2025-02-20 DIAGNOSIS — Z12.31 ENCOUNTER FOR SCREENING MAMMOGRAM FOR BREAST CANCER: ICD-10-CM

## 2025-02-20 DIAGNOSIS — Z01.419 WELL WOMAN EXAM WITH ROUTINE GYNECOLOGICAL EXAM: Primary | ICD-10-CM

## 2025-02-20 PROCEDURE — 99396 PREV VISIT EST AGE 40-64: CPT | Performed by: OBSTETRICS & GYNECOLOGY

## 2025-02-20 PROCEDURE — 1036F TOBACCO NON-USER: CPT | Performed by: OBSTETRICS & GYNECOLOGY

## 2025-02-20 PROCEDURE — 3008F BODY MASS INDEX DOCD: CPT | Performed by: OBSTETRICS & GYNECOLOGY

## 2025-02-20 ASSESSMENT — PATIENT HEALTH QUESTIONNAIRE - PHQ9
2. FEELING DOWN, DEPRESSED OR HOPELESS: NOT AT ALL
SUM OF ALL RESPONSES TO PHQ9 QUESTIONS 1 & 2: 0
1. LITTLE INTEREST OR PLEASURE IN DOING THINGS: NOT AT ALL

## 2025-02-20 ASSESSMENT — ENCOUNTER SYMPTOMS
UNEXPECTED WEIGHT CHANGE: 1
LOSS OF SENSATION IN FEET: 0
OCCASIONAL FEELINGS OF UNSTEADINESS: 0
DEPRESSION: 0

## 2025-02-20 ASSESSMENT — PAIN SCALES - GENERAL: PAINLEVEL_OUTOF10: 0-NO PAIN

## 2025-03-05 ENCOUNTER — HOSPITAL ENCOUNTER (OUTPATIENT)
Dept: RADIOLOGY | Facility: CLINIC | Age: 55
Discharge: HOME | End: 2025-03-05
Payer: COMMERCIAL

## 2025-03-05 DIAGNOSIS — Z12.31 ENCOUNTER FOR SCREENING MAMMOGRAM FOR BREAST CANCER: ICD-10-CM

## 2025-03-05 PROCEDURE — 77067 SCR MAMMO BI INCL CAD: CPT

## 2025-03-05 PROCEDURE — 77067 SCR MAMMO BI INCL CAD: CPT | Performed by: STUDENT IN AN ORGANIZED HEALTH CARE EDUCATION/TRAINING PROGRAM

## 2025-03-05 PROCEDURE — 77063 BREAST TOMOSYNTHESIS BI: CPT | Performed by: STUDENT IN AN ORGANIZED HEALTH CARE EDUCATION/TRAINING PROGRAM

## 2025-04-16 ENCOUNTER — APPOINTMENT (OUTPATIENT)
Dept: PRIMARY CARE | Facility: CLINIC | Age: 55
End: 2025-04-16
Payer: COMMERCIAL

## 2025-04-16 VITALS
BODY MASS INDEX: 34.02 KG/M2 | OXYGEN SATURATION: 98 % | HEIGHT: 63 IN | WEIGHT: 192 LBS | DIASTOLIC BLOOD PRESSURE: 82 MMHG | SYSTOLIC BLOOD PRESSURE: 122 MMHG | HEART RATE: 81 BPM

## 2025-04-16 DIAGNOSIS — Z11.3 ROUTINE SCREENING FOR STI (SEXUALLY TRANSMITTED INFECTION): ICD-10-CM

## 2025-04-16 DIAGNOSIS — Z83.49 FAMILY HISTORY OF ALPHA 1 ANTITRYPSIN DEFICIENCY: ICD-10-CM

## 2025-04-16 DIAGNOSIS — Z13.1 DIABETES MELLITUS SCREENING: ICD-10-CM

## 2025-04-16 DIAGNOSIS — Z13.220 LIPID SCREENING: ICD-10-CM

## 2025-04-16 DIAGNOSIS — Z11.59 NEED FOR HEPATITIS C SCREENING TEST: ICD-10-CM

## 2025-04-16 DIAGNOSIS — Z13.29 THYROID DISORDER SCREEN: ICD-10-CM

## 2025-04-16 DIAGNOSIS — N95.1 VASOMOTOR SYMPTOMS DUE TO MENOPAUSE: Primary | ICD-10-CM

## 2025-04-16 DIAGNOSIS — Z00.00 HEALTHCARE MAINTENANCE: ICD-10-CM

## 2025-04-16 PROCEDURE — 3008F BODY MASS INDEX DOCD: CPT

## 2025-04-16 PROCEDURE — 99214 OFFICE O/P EST MOD 30 MIN: CPT

## 2025-04-16 RX ORDER — FLUTICASONE PROPIONATE 50 MCG
1 SPRAY, SUSPENSION (ML) NASAL DAILY
COMMUNITY

## 2025-04-16 NOTE — PROGRESS NOTES
Chief Complaint  Patient ID: Ele Echols is a 54 y.o. female who presents for Follow-up (Wants blood work done , hot flashes , check ears ).            History of Present Illness  1. Family history of genetic liver condition  Would like to be tested for alpha -1 antitrypsin deficiency. Her sister was recently diagnosed and she would like to get genetic testing.  CMP in 2024 with ALT 25, AST 20, alk phos 51, albumin 4.2.    2. Menopause  The past 6 weeks her menopause symptoms have been feeling worse. She is experiencing hot flashes during the day and night, fatigue, vaginal dryness. She does not take anything for this. She is not drenched at night with the night sweats.  She would like blood work to see if anything else is going on.   Follows with Dr. Baum of OB/GYN, but was not having as bad of symptoms when she saw her in 2025.    Review of Systems  All pertinent positive symptoms are included in the history of present illness.    All other systems have been reviewed and are negative and noncontributory to this patient's current ailments.    Past Medical History  She has a past medical history of Breast cyst (dec [id number]), Encounter for screening for malignant neoplasm of vagina, Other conditions influencing health status, Otitis media, unspecified, unspecified ear, Personal history of other benign neoplasm, Personal history of other complications of pregnancy, childbirth and the puerperium, Personal history of other diseases of the female genital tract, and Personal history of other medical treatment.    She has no past medical history of Personal history of irradiation.    Surgical History  She has a past surgical history that includes  section, classic (2014); Hysteroscopy (2017); Other surgical history (2021); Breast biopsy (); and Hysterectomy (hieu 10 2021).     Social History  She reports that she has never smoked. She has never used smokeless tobacco. She  "reports that she does not drink alcohol and does not use drugs.    Family History[1]  Medications Prior to Visit[2]  Allergies  Codeine, Morphine, Penicillins, and Tramadol    Immunization History   Administered Date(s) Administered    Flu vaccine (IIV4), preservative free *Check age/dose* 10/12/2019, 11/17/2023    Moderna SARS-CoV-2 Vaccination 04/13/2021, 05/13/2021    Zoster vaccine, recombinant, adult (SHINGRIX) 09/15/2023, 12/01/2023     Objective   Visit Vitals  /82   Pulse 81   Ht 1.6 m (5' 3\")   Wt 87.1 kg (192 lb)   LMP 12/18/2020   SpO2 98%   BMI 34.01 kg/m²   OB Status Postmenopausal   Smoking Status Never   BSA 1.97 m²        BP Readings from Last 3 Encounters:   04/16/25 122/82   02/20/25 116/74   10/03/24 116/74      Wt Readings from Last 3 Encounters:   04/16/25 87.1 kg (192 lb)   02/20/25 87.1 kg (192 lb)   10/03/24 82.1 kg (181 lb)        Relevant Results  No visits with results within 1 Month(s) from this visit.   Latest known visit with results is:   Lab on 08/31/2024   Component Date Value    Thyroid Stimulating Horm* 08/31/2024 2.27     Cholesterol 08/31/2024 125     HDL-Cholesterol 08/31/2024 39.1     Cholesterol/HDL Ratio 08/31/2024 3.2     LDL Calculated 08/31/2024 71     VLDL 08/31/2024 15     Triglycerides 08/31/2024 73     Non HDL Cholesterol 08/31/2024 86     Glucose 08/31/2024 115 (H)     Sodium 08/31/2024 138     Potassium 08/31/2024 4.2     Chloride 08/31/2024 107     Bicarbonate 08/31/2024 21     Anion Gap 08/31/2024 14     Urea Nitrogen 08/31/2024 11     Creatinine 08/31/2024 0.75     eGFR 08/31/2024 >90     Calcium 08/31/2024 8.8     Albumin 08/31/2024 4.2     Alkaline Phosphatase 08/31/2024 51     Total Protein 08/31/2024 6.7     AST 08/31/2024 20     Bilirubin, Total 08/31/2024 0.7     ALT 08/31/2024 25     WBC 08/31/2024 8.1     nRBC 08/31/2024 0.0     RBC 08/31/2024 4.64     Hemoglobin 08/31/2024 13.7     Hematocrit 08/31/2024 41.3     MCV 08/31/2024 89     MCH 08/31/2024 " 29.5     MCHC 08/31/2024 33.2     RDW 08/31/2024 13.7     Platelets 08/31/2024 260     Neutrophils % 08/31/2024 56.9     Immature Granulocytes %,* 08/31/2024 0.2     Lymphocytes % 08/31/2024 31.4     Monocytes % 08/31/2024 8.4     Eosinophils % 08/31/2024 2.5     Basophils % 08/31/2024 0.6     Neutrophils Absolute 08/31/2024 4.59     Immature Granulocytes Ab* 08/31/2024 0.02     Lymphocytes Absolute 08/31/2024 2.53     Monocytes Absolute 08/31/2024 0.68     Eosinophils Absolute 08/31/2024 0.20     Basophils Absolute 08/31/2024 0.05     Hemoglobin A1C 08/31/2024 6.1 (H)     Estimated Average Glucose 08/31/2024 128      The ASCVD Risk score (Radha JORGE, et al., 2019) failed to calculate for the following reasons:    The valid total cholesterol range is 130 to 320 mg/dL    Physical Exam  CONSTITUTIONAL - well nourished, well developed, looks like stated age, in no acute distress, not ill-appearing, and not tired appearing  SKIN - normal skin color and pigmentation, normal skin turgor without rash, lesions, or nodules visualized  HEAD - no trauma, normocephalic  EYES - extraocular muscles are intact, and normal external exam  ENT - TM's intact, no injection, no signs of infection  NECK - supple without rigidity, no neck mass was observed, no thyromegaly or thyroid nodules  CHEST - clear to auscultation, no wheezing, no crackles and no rales, good effort  CARDIAC - regular rate and regular rhythm, no skipped beats, no murmur  EXTREMITIES - no obvious or evident edema, no obvious or evident deformities  NEUROLOGICAL - normal gait, normal balance, normal motor, no ataxia; alert, oriented and no focal signs  PSYCHIATRIC - alert, pleasant and cordial, age-appropriate  IMMUNOLOGIC - no cervical lymphadenopathy    Assessment and Plan  Problem List Items Addressed This Visit       Vasomotor symptoms due to menopause - Primary    Lab work ordered to test for menopause due to increasing vasomotor symptoms. Hysterectomy due to  fibroids 2 years prior.          Relevant Orders    FSH & LH    Estradiol     Other Visit Diagnoses         Family history of alpha 1 antitrypsin deficiency        Relevant Orders    Alpha-1-Antitrypsin      Lipid screening        Relevant Orders    Lipid Panel      Thyroid disorder screen        Relevant Orders    TSH with reflex to Free T4 if abnormal      Diabetes mellitus screening        Relevant Orders    Hemoglobin A1C      Healthcare maintenance        Relevant Orders    CBC and Auto Differential    Comprehensive Metabolic Panel    TSH with reflex to Free T4 if abnormal    Lipid Panel    Hemoglobin A1C          Mckenna Villegas, S III  [facility]    I have reviewed with medical students note including the assessment and plan. I agree with stated above. I placed the orders for this visit and will follow up with the results.    Patient was staffed with Dr. Hamm,  Stephanie Arzola DO, PGY-3  [facility]         [1]   Family History  Problem Relation Name Age of Onset    Cancer Mother      Other (DEPRESSION AND ANXIETY) Mother      Basal cell carcinoma Father      Diabetes Father      Leukemia Sister      Thyroid disease Sister      Breast cancer Mother's Sister      Brain cancer Father's Brother      Other (breast disorder) Other      Diabetes Other     [2]   Outpatient Medications Prior to Visit   Medication Sig Dispense Refill    cetirizine (ZyrTEC) 10 mg tablet Take 1 tablet (10 mg) by mouth once daily.      fluticasone (Flonase) 50 mcg/actuation nasal spray Administer 1 spray into each nostril once daily. Shake gently. Before first use, prime pump. After use, clean tip and replace cap.      melatonin 5 mg capsule Take by mouth once daily.      multivit with minerals/lutein (MULTIVITAMIN 50 PLUS ORAL) Take 1 tablet by mouth once daily.      ZINC ACETATE ORAL Take 1 tablet by mouth once daily.       No facility-administered medications prior to visit.

## 2025-04-16 NOTE — ASSESSMENT & PLAN NOTE
Lab work ordered to test for menopause due to increasing vasomotor symptoms. Hysterectomy due to fibroids 2 years prior.

## 2025-04-17 NOTE — PROGRESS NOTES
I reviewed and examined the patient. I was present for the key exam elements, and I fully participated in the patient's care. I discussed the management of the care with the resident. I have personally reviewed the pertinent labs and imaging, as well as recent notes, with the patient. I have reviewed the note above and agree with the resident's medical decision making as documented in the resident's note, in addition to the following comments / findings:     Agree with the rest of the plan outlined below by resident physician. No red flags.      The patient understands and agrees to the assessment and plan of care. Patient has also agreed to follow up and comply with the treatment and evaluation as recommended today. Patient was instructed to call the office at [phone] should questions arise regarding their treatment or care.     Armin Hamm DO, FAOASM  Family Medicine   [address], [address]  [place] [address]     Armin Hamm DO

## 2025-04-22 LAB
A1AT SERPL-MCNC: 87 MG/DL (ref 83–199)
ALBUMIN SERPL-MCNC: 4.4 G/DL (ref 3.6–5.1)
ALP SERPL-CCNC: 50 U/L (ref 37–153)
ALT SERPL-CCNC: 25 U/L (ref 6–29)
ANION GAP SERPL CALCULATED.4IONS-SCNC: 8 MMOL/L (CALC) (ref 7–17)
AST SERPL-CCNC: 20 U/L (ref 10–35)
BASOPHILS # BLD AUTO: 39 CELLS/UL (ref 0–200)
BASOPHILS NFR BLD AUTO: 0.5 %
BILIRUB SERPL-MCNC: 0.4 MG/DL (ref 0.2–1.2)
BUN SERPL-MCNC: 15 MG/DL (ref 7–25)
CALCIUM SERPL-MCNC: 9.3 MG/DL (ref 8.6–10.4)
CHLORIDE SERPL-SCNC: 106 MMOL/L (ref 98–110)
CHOLEST SERPL-MCNC: 186 MG/DL
CHOLEST/HDLC SERPL: 4.5 (CALC)
CO2 SERPL-SCNC: 26 MMOL/L (ref 20–32)
CREAT SERPL-MCNC: 0.72 MG/DL (ref 0.5–1.03)
EGFRCR SERPLBLD CKD-EPI 2021: 101 ML/MIN/1.73M2
EOSINOPHIL # BLD AUTO: 218 CELLS/UL (ref 15–500)
EOSINOPHIL NFR BLD AUTO: 2.8 %
ERYTHROCYTE [DISTWIDTH] IN BLOOD BY AUTOMATED COUNT: 13.3 % (ref 11–15)
EST. AVERAGE GLUCOSE BLD GHB EST-MCNC: 137 MG/DL
EST. AVERAGE GLUCOSE BLD GHB EST-SCNC: 7.6 MMOL/L
ESTRADIOL SERPL-MCNC: 20 PG/ML
FSH SERPL-ACNC: 50 MIU/ML
GLUCOSE SERPL-MCNC: 138 MG/DL (ref 65–139)
HBA1C MFR BLD: 6.4 %
HCT VFR BLD AUTO: 40.4 % (ref 35–45)
HCV AB SERPL QL IA: NORMAL
HDLC SERPL-MCNC: 41 MG/DL
HGB BLD-MCNC: 13.6 G/DL (ref 11.7–15.5)
HIV 1+2 AB+HIV1 P24 AG SERPL QL IA: NORMAL
LDLC SERPL CALC-MCNC: 107 MG/DL (CALC)
LH SERPL-ACNC: 28.8 MIU/ML
LYMPHOCYTES # BLD AUTO: 2933 CELLS/UL (ref 850–3900)
LYMPHOCYTES NFR BLD AUTO: 37.6 %
MCH RBC QN AUTO: 29.8 PG (ref 27–33)
MCHC RBC AUTO-ENTMCNC: 33.7 G/DL (ref 32–36)
MCV RBC AUTO: 88.6 FL (ref 80–100)
MONOCYTES # BLD AUTO: 530 CELLS/UL (ref 200–950)
MONOCYTES NFR BLD AUTO: 6.8 %
NEUTROPHILS # BLD AUTO: 4079 CELLS/UL (ref 1500–7800)
NEUTROPHILS NFR BLD AUTO: 52.3 %
NONHDLC SERPL-MCNC: 145 MG/DL (CALC)
PLATELET # BLD AUTO: 231 THOUSAND/UL (ref 140–400)
PMV BLD REES-ECKER: 10.9 FL (ref 7.5–12.5)
POTASSIUM SERPL-SCNC: 3.6 MMOL/L (ref 3.5–5.3)
PROT SERPL-MCNC: 6.8 G/DL (ref 6.1–8.1)
RBC # BLD AUTO: 4.56 MILLION/UL (ref 3.8–5.1)
SODIUM SERPL-SCNC: 140 MMOL/L (ref 135–146)
TRIGL SERPL-MCNC: 265 MG/DL
TSH SERPL-ACNC: 1.61 MIU/L
WBC # BLD AUTO: 7.8 THOUSAND/UL (ref 3.8–10.8)

## 2025-05-05 ENCOUNTER — TELEPHONE (OUTPATIENT)
Facility: CLINIC | Age: 55
End: 2025-05-05
Payer: COMMERCIAL

## 2026-04-17 ENCOUNTER — APPOINTMENT (OUTPATIENT)
Facility: CLINIC | Age: 56
End: 2026-04-17
Payer: COMMERCIAL